# Patient Record
Sex: FEMALE | Race: WHITE | HISPANIC OR LATINO | Employment: STUDENT | URBAN - METROPOLITAN AREA
[De-identification: names, ages, dates, MRNs, and addresses within clinical notes are randomized per-mention and may not be internally consistent; named-entity substitution may affect disease eponyms.]

---
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
## 2021-05-08 ENCOUNTER — IMMUNIZATIONS (OUTPATIENT)
Dept: FAMILY MEDICINE CLINIC | Facility: HOSPITAL | Age: 21
End: 2021-05-08

## 2021-05-08 DIAGNOSIS — Z23 ENCOUNTER FOR IMMUNIZATION: Primary | ICD-10-CM

## 2021-05-08 PROCEDURE — 91300 SARS-COV-2 / COVID-19 MRNA VACCINE (PFIZER-BIONTECH) 30 MCG: CPT

## 2021-05-08 PROCEDURE — 0001A SARS-COV-2 / COVID-19 MRNA VACCINE (PFIZER-BIONTECH) 30 MCG: CPT

## 2021-05-29 ENCOUNTER — IMMUNIZATIONS (OUTPATIENT)
Dept: FAMILY MEDICINE CLINIC | Facility: HOSPITAL | Age: 21
End: 2021-05-29

## 2021-05-29 DIAGNOSIS — Z23 ENCOUNTER FOR IMMUNIZATION: Primary | ICD-10-CM

## 2021-05-29 PROCEDURE — 0002A SARS-COV-2 / COVID-19 MRNA VACCINE (PFIZER-BIONTECH) 30 MCG: CPT

## 2021-05-29 PROCEDURE — 91300 SARS-COV-2 / COVID-19 MRNA VACCINE (PFIZER-BIONTECH) 30 MCG: CPT

## 2021-12-13 ENCOUNTER — IMMUNIZATIONS (OUTPATIENT)
Dept: FAMILY MEDICINE CLINIC | Facility: HOSPITAL | Age: 21
End: 2021-12-13

## 2021-12-13 DIAGNOSIS — Z23 ENCOUNTER FOR IMMUNIZATION: Primary | ICD-10-CM

## 2021-12-13 PROCEDURE — 0001A COVID-19 PFIZER VACC 0.3 ML: CPT

## 2021-12-13 PROCEDURE — 91300 COVID-19 PFIZER VACC 0.3 ML: CPT

## 2023-05-25 ENCOUNTER — OFFICE VISIT (OUTPATIENT)
Dept: OBGYN CLINIC | Facility: OTHER | Age: 23
End: 2023-05-25

## 2023-05-25 ENCOUNTER — TELEPHONE (OUTPATIENT)
Dept: OBGYN CLINIC | Facility: HOSPITAL | Age: 23
End: 2023-05-25

## 2023-05-25 VITALS
BODY MASS INDEX: 26.43 KG/M2 | WEIGHT: 140 LBS | HEIGHT: 61 IN | HEART RATE: 87 BPM | DIASTOLIC BLOOD PRESSURE: 80 MMHG | SYSTOLIC BLOOD PRESSURE: 127 MMHG

## 2023-05-25 DIAGNOSIS — R20.2 NUMBNESS AND TINGLING IN LEFT HAND: ICD-10-CM

## 2023-05-25 DIAGNOSIS — R20.0 NUMBNESS AND TINGLING IN LEFT HAND: ICD-10-CM

## 2023-05-25 DIAGNOSIS — R20.0 NUMBNESS AND TINGLING IN RIGHT HAND: ICD-10-CM

## 2023-05-25 DIAGNOSIS — M79.642 LEFT HAND PAIN: ICD-10-CM

## 2023-05-25 DIAGNOSIS — R20.2 NUMBNESS AND TINGLING IN RIGHT HAND: ICD-10-CM

## 2023-05-25 DIAGNOSIS — M79.641 RIGHT HAND PAIN: Primary | ICD-10-CM

## 2023-05-25 RX ORDER — VENLAFAXINE HYDROCHLORIDE 75 MG/1
CAPSULE, EXTENDED RELEASE ORAL
COMMUNITY
Start: 2023-05-23

## 2023-05-25 RX ORDER — DESOGESTREL AND ETHINYL ESTRADIOL 21-5 (28)
KIT ORAL
COMMUNITY
Start: 2023-04-13

## 2023-05-25 RX ORDER — ATOMOXETINE 40 MG/1
CAPSULE ORAL
COMMUNITY
Start: 2023-05-02

## 2023-05-25 NOTE — PROGRESS NOTES
Orthopaedic Surgery - Office Note  Adonis Valencia (27 y o  female)   : 2000   MRN: [id number]  Encounter Date: 2023    Chief Complaint   Patient presents with   • Left Hand - Pain   • Right Hand - Pain         Assessment/Plan  Diagnoses and all orders for this visit:    Right hand pain  -     [address] MSK limited; Future  -     Ambulatory Referral to Hand Surgery; Future    Left hand pain  -     Durable Medical Equipment  -     [place] [facility] limited; Future  -     Ambulatory Referral to Hand Surgery; Future    Numbness and tingling in right hand  -     [address] [facility] limited; Future  -     Ambulatory Referral to Hand Surgery; Future    Numbness and tingling in left hand  -     Durable Medical Equipment  -     [place] [facility] limited; Future  -     Ambulatory Referral to Hand Surgery; Future    Other orders  -     atoMOXetine (STRATTERA) 40 mg capsule  -     Volnea 0 15-0 02/0 01 MG () per tablet  -     venlafaxine (EFFEXOR-XR) 75 mg 24 hr capsule    The carpal tunnel syndrome diagnosis was reviewed with the patient in the office today  Initial treatment recommendations would consist of nighttime carpal tunnel splinting  Oral anti-inflammatory such as Aleve 1 tablet twice daily with food stopping and calling if any stomach upset occurs  Patient will be provided a physician directed home exercise program for carpal tunnel syndrome and will be available in the after visit summary  In addition patient will be sent for an ultrasound of the wrist to confirm the diagnosis  Patient will follow-up with a hand surgeon to discuss the success/failure of the conservative treatments as well as the ultrasound results  All question concerns were answered in the office today  In addition I would recommend ergonomic changes to her computer and mouse set up      Patient will need to monitor her tremor like symptoms as they may be unrelated to the carpal tunnel syndrome "symptoms  She may need to follow-up with her PCP for this condition and possibly a neurological work-up if not improved with carpal tunnel treatment       Return for Recheck with hand surgeon to discuss ultrasound results  History of Present Illness  This is a new patient with 6-week history of significant right hand pain with associated numbness and tingling involving the thumb index and middle on the right greater than left side  She is right-hand dominant  No trauma is reported to the right or left hand  Her symptoms gradually worsened through the semester while she is training to be a   who spends most of the time on a computer keyboard and mouse  She denies having an ergonomic set up at school  She has not had any treatment for these conditions  She is having symptoms worse at night and especially in the morning when she awakes  She notices weakness in her right and left hand  She also describes a vague tremor on the right hand occasionally when lifting a cup  Review of Systems  Pertinent items are noted in HPI  All other systems were reviewed and are negative  Physical Exam  /80 (BP Location: Left arm, Patient Position: Sitting, Cuff Size: Standard)   Pulse 87   Ht 5' 1\" (1 549 m)   Wt 63 5 kg (140 lb)   BMI 26 45 kg/m²   Cons: Appears well  No apparent distress  Psych: Alert  Oriented x3  Mood and affect normal   Patient's right hand is without skin breakdown lesion or signs of infection in the office today  There is no thenar atrophy or wasting  Her  strength is decreased to 4 out of 5  Pinch strength is 5 out of 5  There is a slight essential tremor  She has a positive Phalen's at 15 seconds  She has a negative Tinel's  She has full active and passive range of motion of the right wrist   Her interosseous finger strength is 5 out of 5    She has a negative Finkelstein's test   She is nontender throughout palpation of the " right hand  Her elbow exam is unremarkable  Patient's left hand is without skin breakdown lesion or signs of infection  There is no thenar atrophy or wasting   strength is 5 out of 5  Pinch strength is 5 out of 5  No tremor is noted on the left side  She has a positive Phalen's at 15 seconds  She has a negative Tinel's at the carpal tunnel  She has full active and passive ranging of the left wrist   Her interosseous finger strength is 5 out of 5  She has a negative Finkelstein's test   She is nontender to palpation at the left hand  Her elbow exam is unremarkable  Distal radial ulnar pulses are +2 and symmetric               Studies Reviewed  X-rays not indicated at this time      Medical, Surgical, Family, and Social History  The patient's medical history, family history, and social history, were reviewed and updated as appropriate  Past Medical History:   Diagnosis Date   • Asthma        History reviewed  No pertinent surgical history  History reviewed  No pertinent family history      Social History     Occupational History   • Not on file   Tobacco Use   • Smoking status: Never   • Smokeless tobacco: Not on file   Substance and Sexual Activity   • Alcohol use: Not on file   • Drug use: Not on file   • Sexual activity: Not on file       No Known Allergies      Current Outpatient Medications:   •  albuterol (PROVENTIL HFA,VENTOLIN HFA) 90 mcg/act inhaler, Inhale 2 puffs every 6 (six) hours as needed for wheezing, Disp: , Rfl:   •  montelukast (SINGULAIR) 10 mg tablet, Take 10 mg by mouth daily at bedtime, Disp: , Rfl:   •  atoMOXetine (STRATTERA) 40 mg capsule, , Disp: , Rfl:   •  ibuprofen (MOTRIN) 600 mg tablet, Take 1 tablet by mouth every 8 (eight) hours as needed for mild pain for up to 15 days, Disp: 15 tablet, Rfl: 0  •  venlafaxine (EFFEXOR-XR) 75 mg 24 hr capsule, , Disp: , Rfl:   •  Volnea 0 15-0 02/0 01 MG (21/5) per tablet, , Disp: , Rfl:       Lizandro Ching PA-C

## 2023-05-25 NOTE — PATIENT INSTRUCTIONS
Carpal Tunnel Syndrome Exercises   WHAT YOU NEED TO KNOW:   What do I need to know about carpal tunnel syndrome (CTS) exercises? CTS exercises can help relieve pain and increase your range of motion  Your healthcare provider or physical therapist can tell you how often to do the exercises  How do I perform CTS exercises? Finger extensions:  Hold your fingers and thumb close together  Keep them straight  Put a rubber band around the outside of your fingers and thumb  Spread your fingers apart  Then slowly bring them together without letting the rubber band fall off  Repeat 40 times  Wrist flexor stretch:  Hold your arm out straight  Grasp your fingers with your other hand  Slowly bend the fingers back (palm facing away) until you feel a stretch in your wrist  Hold for 10 seconds  Repeat 5 times  Wrist extensor stretch:  Hold your arm out straight  Grasp your fingers with your other hand  Slowly bend the fingers and hand down (palm facing you) until you feel a stretch on top of your hand  Hold for 10 seconds  Repeat 5 times  Wrist curls without weights:  Sit in a chair with your forearm resting on your thigh or a table  With your palm facing down, flex your wrist up 3 inches and slowly lower it  Turn your forearm over and repeat with your palm facing up  Do each exercise 20 times  Shrugs:  Stand with your arms by your side  Lift your shoulders up to your ears and hold for 1 second  Then pull your shoulders back, pinching your shoulder blades together  Hold for 1 second  Then relax your shoulders  Repeat 20 times  CARE AGREEMENT:   You have the right to help plan your care  Learn about your health condition and how it may be treated  Discuss treatment options with your healthcare providers to decide what care you want to receive  You always have the right to refuse treatment  The above information is an  only   It is not intended as medical advice for individual conditions or treatments  Talk to your doctor, nurse or pharmacist before following any medical regimen to see if it is safe and effective for you  © Copyright [employer] 2022 Information is for End User's use only and may not be sold, redistributed or otherwise used for commercial purposes

## 2023-05-25 NOTE — TELEPHONE ENCOUNTER
Patient is being referred to a orthopedics  Please schedule accordingly      [address]   [phone]

## 2023-08-07 ENCOUNTER — HOSPITAL ENCOUNTER (OUTPATIENT)
Dept: RADIOLOGY | Facility: HOSPITAL | Age: 23
Discharge: HOME | End: 2023-08-07
Payer: MEDICARE

## 2023-08-07 DIAGNOSIS — R20.2 NUMBNESS AND TINGLING IN LEFT HAND: ICD-10-CM

## 2023-08-07 DIAGNOSIS — R20.0 NUMBNESS AND TINGLING IN LEFT HAND: ICD-10-CM

## 2023-08-07 DIAGNOSIS — M79.642 LEFT HAND PAIN: ICD-10-CM

## 2023-08-07 PROCEDURE — 76882 US LMTD JT/FCL EVL NVASC XTR: CPT

## 2023-08-21 ENCOUNTER — APPOINTMENT (OUTPATIENT)
Dept: LAB | Facility: CLINIC | Age: 23
End: 2023-08-21
Payer: MEDICARE

## 2023-08-21 DIAGNOSIS — A09 DIARRHEA OF INFECTIOUS ORIGIN: ICD-10-CM

## 2023-08-21 PROCEDURE — 87507 IADNA-DNA/RNA PROBE TQ 12-25: CPT

## 2023-08-21 PROCEDURE — 87505 NFCT AGENT DETECTION GI: CPT

## 2023-08-21 PROCEDURE — 36415 COLL VENOUS BLD VENIPUNCTURE: CPT

## 2023-08-22 LAB
CAMPYLOBACTER DNA SPEC NAA+PROBE: NORMAL
SALMONELLA DNA SPEC QL NAA+PROBE: NORMAL
SHIGA TOXIN STX GENE SPEC NAA+PROBE: NORMAL
SHIGELLA DNA SPEC QL NAA+PROBE: NORMAL

## 2023-08-24 LAB — MISCELLANEOUS LAB TEST RESULT: NORMAL

## 2023-08-30 ENCOUNTER — OFFICE VISIT (OUTPATIENT)
Dept: OBGYN CLINIC | Facility: HOSPITAL | Age: 23
End: 2023-08-30
Payer: MEDICARE

## 2023-08-30 VITALS
BODY MASS INDEX: 27 KG/M2 | HEIGHT: 61 IN | WEIGHT: 143 LBS | DIASTOLIC BLOOD PRESSURE: 72 MMHG | SYSTOLIC BLOOD PRESSURE: 103 MMHG | HEART RATE: 87 BPM | OXYGEN SATURATION: 97 %

## 2023-08-30 DIAGNOSIS — R20.2 NUMBNESS AND TINGLING IN RIGHT HAND: ICD-10-CM

## 2023-08-30 DIAGNOSIS — M79.642 LEFT HAND PAIN: ICD-10-CM

## 2023-08-30 DIAGNOSIS — R20.0 NUMBNESS AND TINGLING IN RIGHT HAND: ICD-10-CM

## 2023-08-30 DIAGNOSIS — M79.641 RIGHT HAND PAIN: ICD-10-CM

## 2023-08-30 DIAGNOSIS — R20.2 NUMBNESS AND TINGLING IN LEFT HAND: ICD-10-CM

## 2023-08-30 DIAGNOSIS — G56.10 MEDIAN NERVE NEURITIS, UNSPECIFIED LATERALITY: Primary | ICD-10-CM

## 2023-08-30 DIAGNOSIS — R20.0 NUMBNESS AND TINGLING IN LEFT HAND: ICD-10-CM

## 2023-08-30 PROCEDURE — 99244 OFF/OP CNSLTJ NEW/EST MOD 40: CPT | Performed by: ORTHOPAEDIC SURGERY

## 2023-08-30 NOTE — PROGRESS NOTES
ASSESSMENT/PLAN:    Assessment:   Bilateral median neuritis     Plan:   Discussed conservative management with activity modification, ergonomic changes, nighttime bracing, physical therapy, and cortisone injections. Patient would like to proceed with activity modification, nighttime bracing, hand therapy, and ergonomic changes. Referral to hand therapy provided for ergonomic assessment and nerve glides. Continue use of nighttime bracing. She may continue activities as tolerated. Follow Up:  PRN    General Discussions:  Carpal Tunnel Syndrome: The anatomy and physiology of carpal tunnel syndrome was discussed with the patient today. Increase pressure localized under the transverse carpal ligament can cause pain, numbness, tingling, or dysesthesias within the median nerve distribution as well as feelings of fatigue, clumsiness, or awkwardness. These symptoms typically occur at night and worse in the morning upon waking. Eventually, untreated carpal tunnel syndrome can result in weakness and permanent loss of muscle within the thenar compartment of the hand. Treatment options were discussed with the patient. Conservative treatment includes nocturnal resting splints to keep the nerve in a neutral position, ergonomic changes within the work or home environment, activity modification, and tendon gliding exercises. Steroid injections within the carpal canal can help a majority of patients, however this is often self-limited in a majority of patients.   Surgical intervention to divide the transverse carpal ligament typically results in a long-lasting relief of the patient's complaints, with the recurrence rate of less than 1%.     _____________________________________________________  CHIEF COMPLAINT:  Chief Complaint   Patient presents with   • Right Wrist - Carpal Tunnel     US- 8/7/23   • Left Wrist - Carpal Tunnel     US- 8/7/23       SUBJECTIVE:  Tracey Fu is a RHD 26 y.o. female who presents with bilateral hand pain ongoing for a few months. She does not recall any specific injury but notes that she has increased pain with using computer. She is experiencing pain into the thumb, index, long, and ring fingers bilaterally that goes up her forearm and into her biceps bilaterally. She was dropping objects frequently and her symptoms did wake her up throughout the night. She describes a positive flick sign. She has started utilizing nighttime cock-up wrist braces which have improved her symptoms. She denies any numbness or tingling. PAST MEDICAL HISTORY:  Past Medical History:   Diagnosis Date   • Asthma        PAST SURGICAL HISTORY:  History reviewed. No pertinent surgical history. FAMILY HISTORY:  History reviewed. No pertinent family history. SOCIAL HISTORY:  Social History     Tobacco Use   • Smoking status: Never       MEDICATIONS:    Current Outpatient Medications:   •  albuterol (PROVENTIL HFA,VENTOLIN HFA) 90 mcg/act inhaler, Inhale 2 puffs every 6 (six) hours as needed for wheezing, Disp: , Rfl:   •  atoMOXetine (STRATTERA) 40 mg capsule, , Disp: , Rfl:   •  ibuprofen (MOTRIN) 600 mg tablet, Take 1 tablet by mouth every 8 (eight) hours as needed for mild pain for up to 15 days, Disp: 15 tablet, Rfl: 0  •  montelukast (SINGULAIR) 10 mg tablet, Take 10 mg by mouth daily at bedtime, Disp: , Rfl:   •  venlafaxine (EFFEXOR-XR) 75 mg 24 hr capsule, , Disp: , Rfl:   •  Volnea 0.15-0.02/0.01 MG (21/5) per tablet, , Disp: , Rfl:     ALLERGIES:  No Known Allergies    REVIEW OF SYSTEMS:  Pertinent items are noted in HPI. A comprehensive review of systems was negative.     LABS:  HgA1c: No results found for: "HGBA1C"  BMP: No results found for: "GLUCOSE", "CALCIUM", "NA", "K", "CO2", "CL", "BUN", "CREATININE"      _____________________________________________________  PHYSICAL EXAMINATION:  Vital signs: /72   Pulse 87   Ht 5' 1" (1.549 m)   Wt 64.9 kg (143 lb)   SpO2 97%   BMI 27.02 kg/m² General: well developed and well nourished, alert, oriented times 3 and appears comfortable  Psychiatric: Normal  HEENT: Trachea Midline, No torticollis  Cardiovascular: No discernable arrhythmia  Pulmonary: No wheezing or stridor  Abdomen: No rebound or guarding  Extremities: No peripheral edema  Skin: No masses, erythema, lacerations, fluctation, ulcerations  Neurovascular: Pulses Intact    MUSCULOSKELETAL EXAMINATION:  Bilateral wrists: no tenderness to palpation, no swelling, no erythema, no atrophy, 5/5 intrinsics, 5/5 AIN, 5/5 APB, negative tinel's, negative carpal tunnel compression test    _____________________________________________________  STUDIES REVIEWED:  Images were reviewed in PACS by Dr. Margarita Lindo and demonstrate: Ultrasound of bilateral wrists 8/7/2023 were reviewed and show CSAc measuring 11.9 sq mm bilaterally      PROCEDURES PERFORMED:  Procedures  No Procedures performed today   Scribe Attestation    I,:  London Huggins am acting as a scribe while in the presence of the attending physician.:       I,:  Benito Hyde MD personally performed the services described in this documentation    as scribed in my presence.:

## 2023-08-30 NOTE — LETTER
September 1, 2023     [address] [place]  [address] [address]    Patient: Billie Barnard   YOB: 2000   Date of Visit: 8/30/2023       Dear Dr. Jennifer Saul: Thank you for referring Raheem Moreno to me for evaluation. Below are my notes for this consultation. If you have questions, please do not hesitate to call me. I look forward to following your patient along with you. Sincerely,        Campbell Devries MD        CC: No Recipients    Campbell Devries MD  8/31/2023 12:39 PM  Signed  ASSESSMENT/PLAN:    Assessment:   Bilateral median neuritis     Plan:   Discussed conservative management with activity modification, ergonomic changes, nighttime bracing, physical therapy, and cortisone injections. Patient would like to proceed with activity modification, nighttime bracing, hand therapy, and ergonomic changes. Referral to hand therapy provided for ergonomic assessment and nerve glides. Continue use of nighttime bracing. She may continue activities as tolerated. Follow Up:  PRN    General Discussions:  Carpal Tunnel Syndrome: The anatomy and physiology of carpal tunnel syndrome was discussed with the patient today. Increase pressure localized under the transverse carpal ligament can cause pain, numbness, tingling, or dysesthesias within the median nerve distribution as well as feelings of fatigue, clumsiness, or awkwardness. These symptoms typically occur at night and worse in the morning upon waking. Eventually, untreated carpal tunnel syndrome can result in weakness and permanent loss of muscle within the thenar compartment of the hand. Treatment options were discussed with the patient. Conservative treatment includes nocturnal resting splints to keep the nerve in a neutral position, ergonomic changes within the work or home environment, activity modification, and tendon gliding exercises.   Steroid injections within the carpal canal can help a majority of patients, however this is often self-limited in a majority of patients. Surgical intervention to divide the transverse carpal ligament typically results in a long-lasting relief of the patient's complaints, with the recurrence rate of less than 1%.     _____________________________________________________  CHIEF COMPLAINT:  Chief Complaint   Patient presents with   • Right Wrist - Carpal Tunnel     US- 8/7/23   • Left Wrist - Carpal Tunnel     US- 8/7/23       SUBJECTIVE:  Babar Juarez is a RHD 26 y.o. female who presents with bilateral hand pain ongoing for a few months. She does not recall any specific injury but notes that she has increased pain with using computer. She is experiencing pain into the thumb, index, long, and ring fingers bilaterally that goes up her forearm and into her biceps bilaterally. She was dropping objects frequently and her symptoms did wake her up throughout the night. She describes a positive flick sign. She has started utilizing nighttime cock-up wrist braces which have improved her symptoms. She denies any numbness or tingling. PAST MEDICAL HISTORY:  Past Medical History:   Diagnosis Date   • Asthma        PAST SURGICAL HISTORY:  History reviewed. No pertinent surgical history. FAMILY HISTORY:  History reviewed. No pertinent family history.     SOCIAL HISTORY:  Social History     Tobacco Use   • Smoking status: Never       MEDICATIONS:    Current Outpatient Medications:   •  albuterol (PROVENTIL HFA,VENTOLIN HFA) 90 mcg/act inhaler, Inhale 2 puffs every 6 (six) hours as needed for wheezing, Disp: , Rfl:   •  atoMOXetine (STRATTERA) 40 mg capsule, , Disp: , Rfl:   •  ibuprofen (MOTRIN) 600 mg tablet, Take 1 tablet by mouth every 8 (eight) hours as needed for mild pain for up to 15 days, Disp: 15 tablet, Rfl: 0  •  montelukast (SINGULAIR) 10 mg tablet, Take 10 mg by mouth daily at bedtime, Disp: , Rfl:   •  venlafaxine (EFFEXOR-XR) 75 mg 24 hr capsule, , Disp: , Rfl:   •  Volnea 0.15-0.02/0.01 MG (21/5) per tablet, , Disp: , Rfl:     ALLERGIES:  No Known Allergies    REVIEW OF SYSTEMS:  Pertinent items are noted in HPI. A comprehensive review of systems was negative.     LABS:  HgA1c: No results found for: "HGBA1C"  BMP: No results found for: "GLUCOSE", "CALCIUM", "NA", "K", "CO2", "CL", "BUN", "CREATININE"      _____________________________________________________  PHYSICAL EXAMINATION:  Vital signs: /72   Pulse 87   Ht 5' 1" (1.549 m)   Wt 64.9 kg (143 lb)   SpO2 97%   BMI 27.02 kg/m²   General: well developed and well nourished, alert, oriented times 3 and appears comfortable  Psychiatric: Normal  HEENT: Trachea Midline, No torticollis  Cardiovascular: No discernable arrhythmia  Pulmonary: No wheezing or stridor  Abdomen: No rebound or guarding  Extremities: No peripheral edema  Skin: No masses, erythema, lacerations, fluctation, ulcerations  Neurovascular: Pulses Intact    MUSCULOSKELETAL EXAMINATION:  Bilateral wrists: no tenderness to palpation, no swelling, no erythema, no atrophy, 5/5 intrinsics, 5/5 AIN, 5/5 APB, negative tinel's, negative carpal tunnel compression test    _____________________________________________________  STUDIES REVIEWED:  Images were reviewed in PACS by Dr. Yelena Albarado and demonstrate: Ultrasound of bilateral wrists 8/7/2023 were reviewed and show CSAc measuring 11.9 sq mm bilaterally      PROCEDURES PERFORMED:  Procedures  No Procedures performed today   Scribe Attestation      I,:  Fernandez Wei am acting as a scribe while in the presence of the attending physician.:       I,:  Martha Mcclendon MD personally performed the services described in this documentation    as scribed in my presence.: